# Patient Record
Sex: MALE | Race: BLACK OR AFRICAN AMERICAN | NOT HISPANIC OR LATINO | URBAN - METROPOLITAN AREA
[De-identification: names, ages, dates, MRNs, and addresses within clinical notes are randomized per-mention and may not be internally consistent; named-entity substitution may affect disease eponyms.]

---

## 2019-12-03 ENCOUNTER — EMERGENCY (EMERGENCY)
Facility: HOSPITAL | Age: 24
LOS: 1 days | Discharge: HOME | End: 2019-12-03
Attending: EMERGENCY MEDICINE
Payer: COMMERCIAL

## 2019-12-03 VITALS — WEIGHT: 248.02 LBS | HEIGHT: 69 IN

## 2019-12-03 PROCEDURE — 99284 EMERGENCY DEPT VISIT MOD MDM: CPT

## 2019-12-03 PROCEDURE — 76882 US LMTD JT/FCL EVL NVASC XTR: CPT | Mod: 26

## 2019-12-03 PROCEDURE — 73564 X-RAY EXAM KNEE 4 OR MORE: CPT

## 2019-12-03 PROCEDURE — 76882 US LMTD JT/FCL EVL NVASC XTR: CPT

## 2019-12-03 PROCEDURE — 73564 X-RAY EXAM KNEE 4 OR MORE: CPT | Mod: 26,RT

## 2019-12-03 RX ORDER — IBUPROFEN 200 MG
600 TABLET ORAL ONCE
Refills: 0 | Status: COMPLETED | OUTPATIENT
Start: 2019-12-03 | End: 2019-12-03

## 2019-12-03 RX ADMIN — Medication 600 MILLIGRAM(S): at 22:50

## 2019-12-03 NOTE — ED PROVIDER NOTE - OBJECTIVE STATEMENT
22 yo male in Room 3L presents to the ER with 2 days of right knee pain s/p fall down 4 steps.  Pt states "I slipped walking down the stairs and fell about 4 steps with my right knee first landing on step. I am able to walk but very slowly and with pain". Pt with effusion to right knee with full rom, unable to raise leg. No obvious deformity noted. Pedal pulses present. Pt denies loc at time of fall. No neck and back pain.

## 2019-12-03 NOTE — ED ADULT NURSE NOTE - OBJECTIVE STATEMENT
23y Male A&Ox3 came to ED c/o knee pain/injury. No PMH or PSH.  PT states slipped down 4 steps yesterday at 2300 and injured R knee. Pt states he was able to bear weight and move around but pain worsened as day went on. Pt presents with R knee swelling, 6/10 pain when moving knee in certain positions. Denies chest pain, sob, ha, n/v/d, abdominal pain, f/c, urinary symptoms, hematuria. +2 pulses in all extremities with full ROM, no numbness, tingling.

## 2019-12-03 NOTE — ED PROVIDER NOTE - CLINICAL SUMMARY MEDICAL DECISION MAKING FREE TEXT BOX
s/p knee injury yesterday after falling down 4 steps- xray neg- motrin given- immobilize and crutches - ortho f/u

## 2019-12-03 NOTE — ED PROVIDER NOTE - PATIENT PORTAL LINK FT
You can access the FollowMyHealth Patient Portal offered by [facility] by registering at the following website: [email or web address]. By joining [facility]’s FollowMyHealth portal, you will also be able to view your health information using other applications (apps) compatible with our system.

## 2019-12-04 NOTE — ED PROCEDURE NOTE - CPROC ED POST PROC CARE GUIDE1
Elevate the injured extremity as instructed./Verbal/written post procedure instructions were given to patient/caregiver./Instructed patient/caregiver to follow-up with primary care physician./Keep the cast/splint/dressing clean and dry./Instructed patient/caregiver regarding signs and symptoms of infection.

## 2019-12-12 ENCOUNTER — APPOINTMENT (OUTPATIENT)
Dept: ORTHOPEDIC SURGERY | Facility: CLINIC | Age: 24
End: 2019-12-12
Payer: COMMERCIAL

## 2019-12-12 VITALS
DIASTOLIC BLOOD PRESSURE: 63 MMHG | HEIGHT: 69 IN | SYSTOLIC BLOOD PRESSURE: 108 MMHG | BODY MASS INDEX: 36.73 KG/M2 | WEIGHT: 248 LBS | HEART RATE: 85 BPM

## 2019-12-12 DIAGNOSIS — R41.841 COGNITIVE COMMUNICATION DEFICIT: ICD-10-CM

## 2019-12-12 DIAGNOSIS — S89.91XA UNSPECIFIED INJURY OF RIGHT LOWER LEG, INITIAL ENCOUNTER: ICD-10-CM

## 2019-12-12 PROBLEM — Z00.00 ENCOUNTER FOR PREVENTIVE HEALTH EXAMINATION: Status: ACTIVE | Noted: 2019-12-12

## 2019-12-12 PROCEDURE — 99203 OFFICE O/P NEW LOW 30 MIN: CPT

## 2019-12-12 NOTE — DISCUSSION/SUMMARY
[de-identified] : 23 year old male presents with right knee pain.\par \par Patient presents with limited ROM and inability to SLR. Concern for possible quad injury. More imaging necessary to confirm. MRI may provide additional information regarding the soft tissue components of the patients pain (including the articular cartilage and meniscus), and may stratify them for potential arthroscopy as an alternative treatment. Patient agreeable to additional cross sectional imaging.\par \par Recommendations: Trial of conservative modalities as above (rest from overhead activity and activity avoidance, ice, NSAIDs if tolerated.\par \par MRI prescription provided. [place] brace given.\par  \par Follow up after MRI\par 
Yes

## 2019-12-12 NOTE — ADDENDUM
[FreeTextEntry1] : This note was written by Mirtha Nuñez on 12/12/2019 acting solely as a scribe for Dr. Rosas Ann.\par \par All medical record entries made by the Scribe were at my, Dr. Rosas Ann, direction and personally dictated by me on 12/12/2019. I have personally reviewed the chart and agree that the record accurately reflects my personal performance of the history, physical exam, assessment and plan.\par

## 2019-12-12 NOTE — HISTORY OF PRESENT ILLNESS
[de-identified] : 23 year old male [employer] employee presents today with right knee pain since 12/2/19. He was coming down stairs, stepped on a crack with his left foot and his right knee slipped forward. He was evaluated  at [facility] ER, x-rays where negative for fx. The pain is brought on with walking and sitting. He has difficulty with full flexion and extension. Unable to SLR. Motrin is helpful. His knee buckled last week but it no longer is. \par \par The patient's past medical history, past surgical history, medications and allergies were reviewed by me today with the patient and documented accordingly. In addition, the patient's family and social history, which were noncontributory to this visit, were reviewed also.

## 2019-12-12 NOTE — PHYSICAL EXAM
[de-identified] : Oriented to time, place, person\par Mood: Normal\par Affect: Normal\par Appearance: Healthy, well appearing, no acute distress.\par Gait: Normal\par Assistive Devices: None\par \par Right knee exam\par \par Skin: Clean, dry, intact\par Inspection: No obvious malalignment, no masses, moderate swelling, large effusion, +ecchymosis throughout anterior and medial knee\par Pulses: 2+ DP/PT pulses\par ROM: 0-90 degrees of flexion. + pain with deep knee flexion\par Tenderness: No MJLT. No LJLT. No pain over the patella facets. + pain to the quadriceps tendon. No pain to the patella tendon. No posterior knee tenderness.\par Stability: Stable to varus, valgus. Negative lachman testing. Negative anterior drawer, negative posterior drawer.\par Strength: 5/5 Q/H/TA/GS/EHL, without atrophy, unable to SLR\par Neuro: In tact to light touch throughout, DTR's normal\par Additional tests: Negative McMurrays test, Negative patellar grind test.  [de-identified] : Images were reviewed [facility] dated 12/3/19.\par \par Multiple views right knee shows soft tissue swelling. No evidence of bony injury, or vin dislocation. There is no underlying degenerative arthritic change seen. Overall alignment is maintained. Otherwise unremarkable.\par

## 2019-12-15 ENCOUNTER — FORM ENCOUNTER (OUTPATIENT)
Age: 24
End: 2019-12-15

## 2019-12-16 ENCOUNTER — APPOINTMENT (OUTPATIENT)
Dept: MRI IMAGING | Facility: IMAGING CENTER | Age: 24
End: 2019-12-16

## 2019-12-16 ENCOUNTER — OUTPATIENT (OUTPATIENT)
Dept: OUTPATIENT SERVICES | Facility: HOSPITAL | Age: 24
LOS: 1 days | End: 2019-12-16
Payer: COMMERCIAL

## 2019-12-16 DIAGNOSIS — Z00.00 ENCOUNTER FOR GENERAL ADULT MEDICAL EXAMINATION WITHOUT ABNORMAL FINDINGS: ICD-10-CM

## 2019-12-16 PROCEDURE — 73721 MRI JNT OF LWR EXTRE W/O DYE: CPT

## 2019-12-16 PROCEDURE — 73721 MRI JNT OF LWR EXTRE W/O DYE: CPT | Mod: 26,RT

## 2019-12-19 ENCOUNTER — APPOINTMENT (OUTPATIENT)
Dept: ORTHOPEDIC SURGERY | Facility: CLINIC | Age: 24
End: 2019-12-19
Payer: COMMERCIAL

## 2019-12-19 PROCEDURE — 99214 OFFICE O/P EST MOD 30 MIN: CPT

## 2019-12-19 NOTE — PHYSICAL EXAM
[de-identified] : Oriented to time, place, person\par Mood: Normal\par Affect: Normal\par Appearance: Healthy, well appearing, no acute distress.\par Gait: Normal\par Assistive Devices: [place]\par \par Right knee exam\par \par Skin: Clean, dry, intact\par Inspection: No obvious malalignment, no masses, moderate swelling, large effusion, +ecchymosis throughout anterior and medial knee\par Pulses: 2+ DP/PT pulses\par ROM: 0-100 degrees of flexion. + pain with deep knee flexion\par Tenderness: No MJLT. No LJLT. No pain over the patella facets. + pain to the quadriceps tendon. No pain to the patella tendon. No posterior knee tenderness.\par Stability: Stable to varus, valgus. Negative lachman testing. Negative anterior drawer, negative posterior drawer.\par Strength: 5/5 H/TA/GS/EHL, without atrophy, unable to SLR, 3+/5 Q\par Neuro: In tact to light touch throughout, DTR's normal\par Additional tests: Negative McMurrays test, Negative patellar grind test.  [de-identified] : Images were reviewed [facility] dated 12/3/19.\par \par Multiple views right knee shows soft tissue swelling. No evidence of bony injury, or vin dislocation. There is no underlying degenerative arthritic change seen. Overall alignment is maintained. Otherwise unremarkable.\par \par MRI right knee dated 12/16/19 shows high grade partial thickness tear of the quadriceps tendon.

## 2019-12-19 NOTE — REASON FOR VISIT
[Initial Visit] : an initial visit for [FreeTextEntry2] : Right knee pain  [Follow-Up Visit] : a follow-up visit for

## 2019-12-19 NOTE — ADDENDUM
[FreeTextEntry1] : This note was written by Mirtha Nuñez on 12/19/2019 acting solely as a scribe for Dr. Rosas Ann.\par \par All medical record entries made by the Scribe were at my, Dr. Rosas Ann, direction and personally dictated by me on 12/19/2019. I have personally reviewed the chart and agree that the record accurately reflects my personal performance of the history, physical exam, assessment and plan.\par

## 2019-12-19 NOTE — DISCUSSION/SUMMARY
[de-identified] : 23 year old male presents with right quad tear.\par \par MRI right knee dated 12/16/19 shows high grade partial thickness tear of the quadriceps. Given the patient's loss of extensor function as well as difficulty with straight leg raise, discussion was had with the patient regarding surgical repair of the right quadriceps tendon. Given that the injury is now a couple of weeks old, discussion was had with the patient regarding acuity of surgical treatment and management. Will require open repair, and discussion was had regarding potential dysfunction without surgical intervention.\par \par All risks, benefits and alternatives to the proposed surgical procedure, right quadriceps repair, as well as the need for formal post-operative rehabilitation were discussed in great detail with the patient. Risks include but are not limited to pain, bleeding, infection, neurovascular injury, stiffness and medical complications (including DVT, PE, MI), and risks of anesthesia. \par \par The patient expressed understanding and all questions were answered. The patient is electing to proceed, and will have the patient urgently.\par

## 2019-12-19 NOTE — HISTORY OF PRESENT ILLNESS
[de-identified] : 23 year old male [employer] employee presents today with right knee pain since 12/2/19. He obtained MRI and here for review of results. Reports improvement of pain since last visit.  He was coming down stairs, stepped on a crack with his left foot and his right knee slipped forward. The pain is brought on with walking and sitting. He has difficulty with full flexion and extension. Unable to SLR fully and concern at the last visit was for quad injury. Motrin is helpful. His knee buckled last week but it no longer is. Ambulating via brace. \par \par

## 2019-12-20 ENCOUNTER — OUTPATIENT (OUTPATIENT)
Dept: OUTPATIENT SERVICES | Facility: HOSPITAL | Age: 24
LOS: 1 days | End: 2019-12-20

## 2019-12-20 VITALS
OXYGEN SATURATION: 99 % | HEART RATE: 98 BPM | WEIGHT: 261.91 LBS | TEMPERATURE: 99 F | DIASTOLIC BLOOD PRESSURE: 80 MMHG | SYSTOLIC BLOOD PRESSURE: 124 MMHG | HEIGHT: 69 IN | RESPIRATION RATE: 16 BRPM

## 2019-12-20 DIAGNOSIS — S76.111A STRAIN OF RIGHT QUADRICEPS MUSCLE, FASCIA AND TENDON, INITIAL ENCOUNTER: ICD-10-CM

## 2019-12-20 DIAGNOSIS — E66.01 MORBID (SEVERE) OBESITY DUE TO EXCESS CALORIES: ICD-10-CM

## 2019-12-20 DIAGNOSIS — S89.91XS UNSPECIFIED INJURY OF RIGHT LOWER LEG, SEQUELA: ICD-10-CM

## 2019-12-20 LAB
HCT VFR BLD CALC: 47 % — SIGNIFICANT CHANGE UP (ref 39–50)
HGB BLD-MCNC: 14.2 G/DL — SIGNIFICANT CHANGE UP (ref 13–17)
MCHC RBC-ENTMCNC: 25.3 PG — LOW (ref 27–34)
MCHC RBC-ENTMCNC: 30.2 % — LOW (ref 32–36)
MCV RBC AUTO: 83.8 FL — SIGNIFICANT CHANGE UP (ref 80–100)
NRBC # FLD: 0 K/UL — SIGNIFICANT CHANGE UP (ref 0–0)
PLATELET # BLD AUTO: 272 K/UL — SIGNIFICANT CHANGE UP (ref 150–400)
PMV BLD: 9.9 FL — SIGNIFICANT CHANGE UP (ref 7–13)
RBC # BLD: 5.61 M/UL — SIGNIFICANT CHANGE UP (ref 4.2–5.8)
RBC # FLD: 13.9 % — SIGNIFICANT CHANGE UP (ref 10.3–14.5)
WBC # BLD: 6.06 K/UL — SIGNIFICANT CHANGE UP (ref 3.8–10.5)
WBC # FLD AUTO: 6.06 K/UL — SIGNIFICANT CHANGE UP (ref 3.8–10.5)

## 2019-12-20 NOTE — H&P PST ADULT - HISTORY OF PRESENT ILLNESS
24yr old male with preop dx of strain of right quadriceps muscle, fascia and tendon presents to have PST eval for Right knee quadriceps tendon repair scheduled on 12/23/2019. Patient states he slipped a flight of stairs and fell on 12/2/2019, was seen in the ER and then was referred to Dr Ann for further evaluation.

## 2019-12-20 NOTE — H&P PST ADULT - NSICDXPROBLEM_GEN_ALL_CORE_FT
PROBLEM DIAGNOSES  Problem: Severe obesity (BMI 35.0-39.9) with comorbidity  Assessment and Plan: DEMARIO pre cautions OR booking notified    Problem: Right knee injury, sequela  Assessment and Plan: Scheduled to have right knee quadriceps tendon repair on 12/23/19. Pre-op instructions given, patient verbalized understanding.   Pepcid given to patient for GI prophylaxis.   Chlorhexidine wash provided, verbal and written instructions with teach back, and the patient verbalized understanding with return demonstration.

## 2019-12-22 ENCOUNTER — TRANSCRIPTION ENCOUNTER (OUTPATIENT)
Age: 24
End: 2019-12-22

## 2019-12-23 ENCOUNTER — OUTPATIENT (OUTPATIENT)
Dept: OUTPATIENT SERVICES | Facility: HOSPITAL | Age: 24
LOS: 1 days | Discharge: HOME | End: 2019-12-23
Payer: COMMERCIAL

## 2019-12-23 ENCOUNTER — APPOINTMENT (OUTPATIENT)
Dept: ORTHOPEDIC SURGERY | Facility: HOSPITAL | Age: 24
End: 2019-12-23

## 2019-12-23 VITALS
DIASTOLIC BLOOD PRESSURE: 71 MMHG | WEIGHT: 261.91 LBS | TEMPERATURE: 98 F | HEIGHT: 69 IN | SYSTOLIC BLOOD PRESSURE: 153 MMHG | RESPIRATION RATE: 20 BRPM | HEART RATE: 91 BPM | OXYGEN SATURATION: 97 %

## 2019-12-23 VITALS — DIASTOLIC BLOOD PRESSURE: 78 MMHG | SYSTOLIC BLOOD PRESSURE: 120 MMHG | OXYGEN SATURATION: 96 % | HEART RATE: 74 BPM

## 2019-12-23 DIAGNOSIS — S76.111A STRAIN OF RIGHT QUADRICEPS MUSCLE, FASCIA AND TENDON, INITIAL ENCOUNTER: ICD-10-CM

## 2019-12-23 PROCEDURE — 27385 REPAIR OF THIGH MUSCLE: CPT | Mod: RT

## 2019-12-23 RX ORDER — IBUPROFEN 200 MG
1 TABLET ORAL
Qty: 0 | Refills: 0 | DISCHARGE

## 2019-12-23 RX ORDER — ASPIRIN/CALCIUM CARB/MAGNESIUM 324 MG
1 TABLET ORAL
Qty: 14 | Refills: 0
Start: 2019-12-23 | End: 2020-01-05

## 2019-12-23 NOTE — ASU DISCHARGE PLAN (ADULT/PEDIATRIC) - CARE PROVIDER_API CALL
Rosas Ann)  Orthopedics  [address]  [place] [address]  Phone: [phone]  Fax: [phone]  Follow Up Time:

## 2019-12-23 NOTE — ASU DISCHARGE PLAN (ADULT/PEDIATRIC) - NURSING INSTRUCTIONS
Progress to regular diet as tolerated.  Keep well hydrated.    Narcotic pain medicine is constipating, buy over the counter stool softener and take as instructed on the bottle

## 2019-12-23 NOTE — ASU DISCHARGE PLAN (ADULT/PEDIATRIC) - ASU DC SPECIAL INSTRUCTIONSFT
Please see preprinted instruction sheet Please follow up with Dr. Ann in 1-2 weeks. Call office to make an appointment.  Please take pain medication as prescribed if necessary.  Take 1 aspirin daily for DVT prophylaxis.  You are weight bearing as tolerated in the [place]i brace locked in extension.  Leave dressing in place until you follow up in office.  Keep dressing clean and dry.

## 2020-01-06 ENCOUNTER — APPOINTMENT (OUTPATIENT)
Dept: ORTHOPEDIC SURGERY | Facility: CLINIC | Age: 25
End: 2020-01-06
Payer: COMMERCIAL

## 2020-01-06 PROBLEM — E66.9 OBESITY, UNSPECIFIED: Chronic | Status: ACTIVE | Noted: 2019-12-20

## 2020-01-06 PROCEDURE — 99024 POSTOP FOLLOW-UP VISIT: CPT

## 2020-01-06 NOTE — HISTORY OF PRESENT ILLNESS
[0] : no pain reported [Clean/Dry/Intact] : clean, dry and intact [Healed] : healed [Swelling] : swollen [Neuro Intact] : an unremarkable neurological exam [Vascular Intact] : ~T peripheral vascular exam normal [Doing Well] : is doing well [Excellent Pain Control] : has excellent pain control [No Sign of Infection] : is showing no signs of infection [Sutures Removed] : sutures were removed [Steri-Strips Removed & Replaced] : steri-strips removed and replaced [None] : None [Chills] : no chills [Fever] : no fever [Nausea] : no nausea [Erythema] : not erythematous [Vomiting] : no vomiting [Discharge] : absent of discharge [Dehiscence] : not dehisced [de-identified] : 24 year old male s/p right quadriceps tendon repair 12/23/19. [de-identified] : 24 year old male s/p right quadriceps tendon repair 12/23/19. He is doing well. He presents in a [place] brace. He is not taking pain medication. Denies post op complications.  [de-identified] : 24 year old male s/p right quadriceps tendon repair\par \par All intraoperative imaging was discussed in detail with the patient. All questions were answered.\par  \par Recommendations:\par  \par 1. PT evaluation and treatment; including AAROM/AROM, therapeutic exercise (include hamstring and quadriceps strengthening), heel slides, nonweightbearing stretch of the gastrocsoleus complex and straight leg raises to prevent extension lag. Progression to closed chain exercises/balance exercise/bike in 2-4 weeks.\par 2. Brace: Continue brace use as discussed\par 3. Meds: Continue ASA 325mg daily, pain medication prn, may transition to NSAIDS.\par 4. Ice/elevate as needed and\par 5. Restrictions: None\par  \par Followup in 4 weeks. [de-identified] : Left knee exam\par  \par Skin: Incision(s) clean, dry, intact, no drainage, healed\par Inspection: Residual swelling, moderate residual effusion, ecchymosis\par Pulses: 2+ DP/PT pulses\par ROM: Not tested.\par Tenderness: Tender throughout anterior knee joint.\par Stability: Stable\par Strength: Intact Q/H/TA/GS/EHL\par Neuro: In tact to light touch throughout

## 2020-01-06 NOTE — ADDENDUM
[FreeTextEntry1] : This note was written by Mirtha Nuñez on 01/06/2020 acting solely as a scribe for Dr. Rosas Ann.\par \par All medical record entries made by the Scribe were at my, Dr. Rosas Ann, direction and personally dictated by me on 01/06/2020. I have personally reviewed the chart and agree that the record accurately reflects my personal performance of the history, physical exam, assessment and plan.\par

## 2020-01-08 ENCOUNTER — RX RENEWAL (OUTPATIENT)
Age: 25
End: 2020-01-08

## 2020-02-03 ENCOUNTER — APPOINTMENT (OUTPATIENT)
Dept: ORTHOPEDIC SURGERY | Facility: CLINIC | Age: 25
End: 2020-02-03
Payer: COMMERCIAL

## 2020-02-03 PROCEDURE — 99024 POSTOP FOLLOW-UP VISIT: CPT

## 2020-02-04 NOTE — HISTORY OF PRESENT ILLNESS
[0] : no pain reported [Clean/Dry/Intact] : clean, dry and intact [Healed] : healed [Swelling] : swollen [Neuro Intact] : an unremarkable neurological exam [Vascular Intact] : ~T peripheral vascular exam normal [Doing Well] : is doing well [Excellent Pain Control] : has excellent pain control [No Sign of Infection] : is showing no signs of infection [None] : None [Chills] : no chills [Fever] : no fever [Nausea] : no nausea [Vomiting] : no vomiting [Erythema] : not erythematous [Discharge] : absent of discharge [Dehiscence] : not dehisced [de-identified] : 24 year old male s/p right quadriceps tendon repair 12/23/19. [de-identified] : 24 year old male s/p right quadriceps tendon repair 12/23/19. He is doing well. He presents in a [place] brace. Attending PT 2 x per week. He is not taking pain medication. Denies post op complications.  [de-identified] : Left knee exam\par  \par Skin: Incision(s) clean, dry, intact, no drainage, healed\par Inspection: Residual swelling, residual effusion\par Pulses: 2+ DP/PT pulses\par ROM: 0-70\par Tenderness: Tender throughout anterior knee joint.\par Stability: Stable\par Strength: Intact Q/H/TA/GS/EHL, Able to SLR no lag\par Neuro: In tact to light touch throughout [de-identified] : 24 year old male s/p right quadriceps tendon repair\par  \par Recommendations:\par  \par 1. Continue PT per protocol; New Rx given. WBAT, closed chain exercises, proprioception exercise, begin elliptical when tolerated, hamstring stretching/strengthening\par 2. Brace: May discontinue brace once full extension and without extensor lag has been achieved\par 3. Meds: OTC pain/Nsaids medication prn\par 4. Continue symptomatic Ice/elevate as needed\par 5. Restrictions: as discussed\par  \par Followup in 4 weeks for work clearance.

## 2020-02-04 NOTE — ADDENDUM
[FreeTextEntry1] : This note was written by Mirtha Nuñez on 02/03/2020 acting solely as a scribe for Dr. Rosas Ann.\par \par All medical record entries made by the Scribe were at my, Dr. Rosas Ann, direction and personally dictated by me on 02/03/2020. I have personally reviewed the chart and agree that the record accurately reflects my personal performance of the history, physical exam, assessment and plan.\par

## 2020-03-02 ENCOUNTER — APPOINTMENT (OUTPATIENT)
Dept: ORTHOPEDIC SURGERY | Facility: CLINIC | Age: 25
End: 2020-03-02
Payer: COMMERCIAL

## 2020-03-02 PROCEDURE — 99024 POSTOP FOLLOW-UP VISIT: CPT

## 2020-03-03 NOTE — HISTORY OF PRESENT ILLNESS
[0] : no pain reported [Healed] : healed [Clean/Dry/Intact] : clean, dry and intact [Swelling] : swollen [Neuro Intact] : an unremarkable neurological exam [Excellent Pain Control] : has excellent pain control [Doing Well] : is doing well [Vascular Intact] : ~T peripheral vascular exam normal [No Sign of Infection] : is showing no signs of infection [None] : None [Chills] : no chills [Nausea] : no nausea [Fever] : no fever [Erythema] : not erythematous [Vomiting] : no vomiting [Discharge] : absent of discharge [Dehiscence] : not dehisced [de-identified] : 24 year old male s/p right quadriceps tendon repair 12/23/19. [de-identified] : 24 year old male s/p right quadriceps tendon repair\par \par Doing well at this time without significant concerns.  Patient has good range of motion, and has regained strength at this time.\par  \par Recommendations:\par  \par 1. Continue PT per protocol; New Rx given. Closed chain exercises, proprioception exercise, elliptical when tolerated, hamstring stretching/strengthening\par 2. Brace: Discontinue brace \par 3. Meds: OTC pain/Nsaids medication prn\par 4. Continue symptomatic Ice/elevate as needed\par 5. Restrictions: as discussed\par  \par Followup 2 months. [de-identified] : 24 year old male s/p right quadriceps tendon repair 12/23/19. He is doing well. He presents in a [place] brace. He is able to walk without a brace at home. Attending PT 2-3 x per week and doing exercises on his own. He is not taking pain medication. Denies post op complications.  [de-identified] : Left knee exam\par  \par Skin: Incision(s) clean, dry, intact, no drainage, healed\par Inspection: Residual swelling, residual effusion\par Pulses: 2+ DP/PT pulses\par ROM: 0-115\par Tenderness: Tender throughout anterior knee joint.\par Stability: Stable\par Strength: Intact Q/H/TA/GS/EHL, Able to SLR no lag\par Neuro: In tact to light touch throughout

## 2020-03-03 NOTE — ADDENDUM
[FreeTextEntry1] : This note was written by Mirtha Nuñez on 03/02/2020 acting solely as a scribe for Dr. Rosas Ann.\par \par All medical record entries made by the Scribe were at my, Dr. Rosas Ann, direction and personally dictated by me on 03/02/2020. I have personally reviewed the chart and agree that the record accurately reflects my personal performance of the history, physical exam, assessment and plan.\par

## 2020-04-26 ENCOUNTER — MESSAGE (OUTPATIENT)
Age: 25
End: 2020-04-26

## 2020-04-30 ENCOUNTER — APPOINTMENT (OUTPATIENT)
Dept: ORTHOPEDIC SURGERY | Facility: CLINIC | Age: 25
End: 2020-04-30
Payer: COMMERCIAL

## 2020-04-30 DIAGNOSIS — S76.111D STRAIN OF RIGHT QUADRICEPS MUSCLE, FASCIA AND TENDON, SUBSEQUENT ENCOUNTER: ICD-10-CM

## 2020-04-30 PROCEDURE — 99213 OFFICE O/P EST LOW 20 MIN: CPT | Mod: 95

## 2020-04-30 NOTE — PHYSICAL EXAM
[de-identified] : ·	Oriented to time, place, person\par ·	Mood: Normal\par ·	Affect: Normal\par ·	Appearance: Healthy, well appearing, no acute distress.\par ·	Gait: Normal\par ·	Assistive Devices: None\par \par Left knee exam\par  \par Skin: Incision(s) clean, dry, intact, no drainage, healed\par Inspection: No swelling, no effusion\par Pulses: Pink perfused\par ROM: 0-125\par Tenderness: None\par Stability: Stable\par Strength: Intact Q/H/TA/GS/EHL, Able to SLR no lag\par Neuro: In tact to light touch throughout.

## 2020-04-30 NOTE — REASON FOR VISIT
[Patient] : the patient [Self] : self [Home] : at home, [unfilled] , at the time of the visit. [Other Location: e.g. Home (Enter Location, City,State)___] : at [unfilled] [FreeTextEntry4] : Gisell Cisse [FreeTextEntry2] : Minesh Benavidez [Follow-Up Visit] : a follow-up visit for [Aftercare Following Surgery] : aftercare following surgery

## 2020-04-30 NOTE — DISCUSSION/SUMMARY
[de-identified] : 24 year old male s/p right quadriceps tendon repair\par \par Patient is still able to function well following right quadriceps tendon repair.  Denies any new complaints, and is continuing to work with physical therapy despite the coronavirus pandemic.  Considering time from surgery, recommendations have been for increased strengthening at this time, with gradual return to impact loading activity as tolerated.\par  \par Recommendations:\par  \par 1. Continue PT per protocol\par 2. Meds: OTC pain/Nsaids medication prn\par  \par Followup 2 months. \par

## 2020-04-30 NOTE — HISTORY OF PRESENT ILLNESS
[de-identified] : 24 year old male s/p right quadriceps tendon repair 12/23/19. He is doing well.  Attending PT 2 x per week. Reports some discomfort with squatting.  He is not taking pain medication. Denies post op complications.  Patient is still able to go to physical therapy, and has been making progress with range of motion.  Patient does report that he is unable to navigate stairs easily at this point.

## 2020-05-02 LAB
SARS-COV-2 IGG SERPL IA-ACNC: 0.9 INDEX
SARS-COV-2 IGG SERPL QL IA: NEGATIVE

## 2020-05-04 ENCOUNTER — APPOINTMENT (OUTPATIENT)
Dept: ORTHOPEDIC SURGERY | Facility: CLINIC | Age: 25
End: 2020-05-04

## 2021-05-19 ENCOUNTER — APPOINTMENT (OUTPATIENT)
Dept: NEUROLOGY | Facility: CLINIC | Age: 26
End: 2021-05-19

## 2021-08-23 NOTE — H&P PST ADULT - CARDIOVASCULAR
IDENTIFYING INFORMATION:      Natasha Thao , 77 y.o., male  [address] [facility],  [address] [place] [address]     Medical Record Number: [id number]          CHIEF COMPLAINT:     Chief Complaint   Patient presents with    Cholesterol Problem    Labs     had labs gone over with Cardiac rehad nurse his labs and thyroid numbers were off. HISTORY OF PRESENT ILLNESS:    Natasha Thao is a 77 y.o. male  has a past medical history of A-fib ([facility] CAD (coronary artery disease) (06/15/2018), Coronary arteriosclerosis (8/7/2020), GERD (gastroesophageal reflux disease), H/O seasonal allergies, High cholesterol, cardiac arrest, Hyperlipidemia (8/7/2020), Hypertension, Ill-defined condition, Kidney stone, MI (myocardial infarction) ([facility] ROQUE on CPAP, and Vitamin D deficiency (8/7/2020). .  he comes in for follow up, had been set up with cardiac rehab at [facility] and the nurse said his thyroid and his renal function was out of whack. Has had cardiac disease for years and thinks it should be service connected. Says it was denied. He is on amiodarone for atrial fibrillation. PAST MEDICAL HISTORY:     Past Medical History:   Diagnosis Date    A-fib [facility])     CAD (coronary artery disease) 06/15/2018    Coronary arteriosclerosis 8/7/2020    GERD (gastroesophageal reflux disease)     H/O seasonal allergies     High cholesterol     Hx of cardiac arrest     VT arrest     Hyperlipidemia 8/7/2020    Hypertension     Ill-defined condition     Urinary urgency    Kidney stone     MI (myocardial infarction) ([facility]     ROQUE on CPAP     2/2/21 pt reports does not use cpap each night, only a few times per week    Vitamin D deficiency 8/7/2020       MEDICATIONS:     Current Outpatient Medications on File Prior to Visit   Medication Sig Dispense Refill    aspirin 81 mg chewable tablet Take 81 mg by mouth daily.       butalbital-aspirin-caffeine (FIORINAL) capsule Take 1 Capsule by mouth every four (4) hours as needed for Headache.  clopidogreL (Plavix) 75 mg tab Take 75 mg by mouth.  LORazepam (ATIVAN) 0.5 mg tablet Take 0.5 mg by mouth every four (4) hours as needed for Anxiety.  polyethylene glycol (Miralax) 17 gram/dose powder Take 17 g by mouth daily.  acetaminophen (TYLENOL) 325 mg tablet Take 2 Tablets by mouth every six (6) hours as needed for Pain. 60 Tablet 0    isosorbide mononitrate ER (IMDUR) 30 mg tablet Take 1 Tablet by mouth daily. 90 Tablet 1    amLODIPine (Norvasc) 2.5 mg tablet Take 1 Tablet by mouth nightly. 30 Tablet 2    Vitamin D3 25 mcg (1,000 unit) tablet Take 2 Tablets by mouth daily.  amiodarone (CORDARONE) 200 mg tablet Take 200 mg by mouth daily.  apixaban (Eliquis) 5 mg tablet Take 5 mg by mouth two (2) times a day.  tolterodine (DETROL) 2 mg tablet Take 2 mg by mouth two (2) times a day.  evolocumab (Repatha SureClick) pen injection 308 mg by SubCUTAneous route every fourteen (14) days.  lidocaine (LIDODERM) 5 % by TransDERmal route as needed. Apply patch to the affected area for 12 hours a day and remove for 12 hours a day.  amLODIPine (NORVASC) 5 mg tablet Take 5 mg by mouth daily. Every am       No current facility-administered medications on file prior to visit.        ALLERGIES:    Allergies   Allergen Reactions    Cefuroxime Unknown (comments)     abd pain    Ibuprofen Other (comments)     Patient states NSAIDS/ ibuprofen upsets his stomach    Statins-Hmg-Coa Reductase Inhibitors Other (comments)     Extremity weakness         SOCIAL HISTORY:     Social History     Tobacco Use    Smoking status: Never Smoker    Smokeless tobacco: Never Used   Vaping Use    Vaping Use: Never used   Substance Use Topics    Alcohol use: No    Drug use: No       SURGICAL HISTORY:    Past Surgical History:   Procedure Laterality Date    COLONOSCOPY N/A 11/12/2018    COLONOSCOPY performed by Rajani Maya MD at [address] [place] [address] COLONOSCOPY 2018    HX COLONOSCOPY      HX ENDOSCOPY      HX HEART CATHETERIZATION  2018    Cardiac cath Stents x2    HX HEART CATHETERIZATION  02/2020    \"patent stent obtuse marginal 1. significant disease noted in branch artery of RCA. THis artery appears to be borderline for any kind of intervention\"    HX LAP CHOLECYSTECTOMY  2005    HX LUMBAR DISKECTOMY      L4-L4    HX ORTHOPAEDIC  2006    Lumbar laminectomy    HX PACEMAKER      linq monitor        FAMILY HISTORY:    Family History   Problem Relation Age of Onset    Diabetes Mother     Stroke Father     Hypertension Father          REVIEW OF SYSTEMS:    I personally collected this information from all available source present (patient/others in room and records available) -[email or web address]    Review of Systems   Constitutional: Negative for chills, diaphoresis, fever and malaise/fatigue. HENT: Negative for congestion, sinus pain and sore throat. Eyes: Negative for blurred vision, double vision and photophobia. Respiratory: Negative for cough and shortness of breath. Cardiovascular: Negative for chest pain and palpitations. Gastrointestinal: Negative for abdominal pain, constipation, diarrhea, heartburn, nausea and vomiting. Genitourinary: Negative for dysuria, frequency and urgency. Musculoskeletal: Positive for joint pain. Negative for back pain, myalgias and neck pain. Skin: Negative for rash.            PHYSICAL EXAMINATION:    Vital Signs:    Visit Vitals  /71 (BP 1 Location: Left upper arm, BP Patient Position: Sitting)   Pulse 67   Temp 97.7 °F (36.5 °C) (Temporal)   Ht 5' 9\" (1.753 m)   Wt 204 lb (92.5 kg)   SpO2 99%   BMI 30.13 kg/m²         Wt Readings from Last 3 Encounters:   08/23/21 204 lb (92.5 kg)   08/16/21 205 lb (93 kg)   08/03/21 206 lb (93.4 kg)     BP Readings from Last 3 Encounters:   08/23/21 131/71   08/16/21 (!) 144/86   08/03/21 124/70         Physical Exam  Constitutional:       Appearance: He is not ill-appearing or toxic-appearing. Eyes:      General: No scleral icterus. Extraocular Movements: Extraocular movements intact. Conjunctiva/sclera: Conjunctivae normal.   Neck:      Vascular: Carotid bruit present. Cardiovascular:      Rate and Rhythm: Normal rate and regular rhythm. Heart sounds: No murmur heard. No friction rub. No gallop. Pulmonary:      Effort: Pulmonary effort is normal.      Breath sounds: Normal breath sounds. No wheezing, rhonchi or rales. Musculoskeletal:         General: No deformity. Cervical back: No tenderness. Right lower leg: No edema. Left lower leg: No edema. Lymphadenopathy:      Cervical: No cervical adenopathy. Skin:     Coloration: Skin is not jaundiced. Findings: No erythema or rash. Neurological:      General: No focal deficit present. Mental Status: He is alert and oriented to person, place, and time. Mental status is at baseline. Psychiatric:         Mood and Affect: Mood normal.         Behavior: Behavior normal.         Thought Content: Thought content normal.         Judgment: Judgment normal.           ASSESSMENT/PLAN:      Discussion (regarding today's visit with Gerald Catherine);  And check his thyroid and metabolic panel.  He was to come back and go over his coronary history for discussion with the veterans. ICD-10-CM ICD-9-CM    1. Coronary arteriosclerosis  I25.10 414.00 TSH 3RD GENERATION      METABOLIC PANEL, COMPREHENSIVE   2. Mixed hyperlipidemia  E78.2 272.2 TSH 3RD GENERATION      METABOLIC PANEL, COMPREHENSIVE        WE reviewed medications, treatment, testing such as labs, imagine, referrals and when to call regarding results and appointments.  Reminded patient to keep any and all appointments with specialists, labs, imaging.  Reminded patient to make sure we get copies of any specialists care, labs and imaging.     Reminded patient to call of come by the office if there are any concerns, questions , comments or problems.  The patient verbalized understanding of the care plan and all questions were answered to the patient's satisfaction prior to leaving the office.  The patient was told that failure to comply with recommended testing could result in abnormal health consequences.  The patient was instructed to have yearly routine health maintenance including but not limited to age appropriate vaccines, testing, screening exams.  ALL questions were answered to his satisfaction before leaving the office. The patient actively participated in medical decision making. FOLLOW UP:     Patient knows to keep any and all future visits scheduled unless told otherwise. Patient knows to call, come back if any concerns, questions, comments or problems arise. Follow-up and Dispositions    · Return if symptoms worsen or fail to improve. Shelli Phoenix DO    This visit was reviewed and signed electronically. It was been completed with voice recognition software and hand typing. It may have syntax and spelling errors despite editing. details… detailed exam

## 2023-02-17 NOTE — ED PROCEDURE NOTE - PROCEDURE ADDITIONAL DETAILS
Emergency Department Focused Ultrasound performed at patient's bedside.  The complete report can be found in PACS. [Initial Evaluation] : an initial evaluation for [FreeTextEntry2] : ear issues

## 2023-08-24 NOTE — ASU PREOP CHECKLIST - TEMPERATURE IN FAHRENHEIT (DEGREES F)
97.9 Qbrexza Counseling:  I discussed with the patient the risks of Qbrexza including but not limited to headache, mydriasis, blurred vision, dry eyes, nasal dryness, dry mouth, dry throat, dry skin, urinary hesitation, and constipation.  Local skin reactions including erythema, burning, stinging, and itching can also occur.

## 2025-07-02 NOTE — H&P PST ADULT - DENTITION
Results reviewed on 7/2/25.   Closing encounter.    denies loose teeth Denies use of dentures.  Denies loose teeth.